# Patient Record
Sex: MALE
[De-identification: names, ages, dates, MRNs, and addresses within clinical notes are randomized per-mention and may not be internally consistent; named-entity substitution may affect disease eponyms.]

---

## 2021-03-19 PROBLEM — Z00.00 ENCOUNTER FOR PREVENTIVE HEALTH EXAMINATION: Status: ACTIVE | Noted: 2021-03-19

## 2023-02-14 ENCOUNTER — NON-APPOINTMENT (OUTPATIENT)
Age: 28
End: 2023-02-14

## 2023-02-14 ENCOUNTER — APPOINTMENT (OUTPATIENT)
Dept: DERMATOLOGY | Facility: CLINIC | Age: 28
End: 2023-02-14
Payer: MEDICAID

## 2023-02-14 DIAGNOSIS — L65.9 NONSCARRING HAIR LOSS, UNSPECIFIED: ICD-10-CM

## 2023-02-14 DIAGNOSIS — L21.9 SEBORRHEIC DERMATITIS, UNSPECIFIED: ICD-10-CM

## 2023-02-14 PROCEDURE — 99204 OFFICE O/P NEW MOD 45 MIN: CPT

## 2023-02-14 RX ORDER — HYDROCORTISONE 25 MG/G
2.5 CREAM TOPICAL
Qty: 1 | Refills: 4 | Status: ACTIVE | COMMUNITY
Start: 2023-02-14 | End: 1900-01-01

## 2023-02-14 RX ORDER — KETOCONAZOLE 20.5 MG/ML
2 SHAMPOO, SUSPENSION TOPICAL
Qty: 1 | Refills: 11 | Status: ACTIVE | COMMUNITY
Start: 2023-02-14 | End: 1900-01-01

## 2023-02-14 RX ORDER — FLUOCINOLONE ACETONIDE 0.1 MG/ML
0.01 SOLUTION TOPICAL
Qty: 1 | Refills: 2 | Status: ACTIVE | COMMUNITY
Start: 2023-02-14 | End: 1900-01-01

## 2023-02-14 NOTE — PHYSICAL EXAM
[Alert] : alert [Oriented x 3] : ~L oriented x 3 [FreeTextEntry3] : Pt consented to examination of external genitalia\par \par Loose scale throughout scalp\par Normal hair density\par Negative hair pull test\par Small pink papules on scrotum; inguinal folds clear

## 2023-02-14 NOTE — ASSESSMENT
[FreeTextEntry1] : # Seborrheic dermatitis - chronic condition not at tx goal\par - Discussed condition, associations, and natural course\par - Favor scalp and scrotum involvement\par - Ketoconazole 2% shampoo 2-3 times weekly; lather for 3-5 minutes then rinse\par - For flares: hydrocortisone 2.5% cream BID PRN on scrotum, no more than 2 weeks at a time and fluocinolone 0.1% solution BID PRN on scalp; SED and proper use reviewed including avoidance of prolonged use\par - Keep groin area dry \par \par # Alopecia\par - No objective alopecia noted today\par - Likely resolved TE vs. natural variation of hair cycle\par - Continue to monitor\par - May start rogaine 5% if desired\par \par # Family hx of skin cancer\par - Recommend finding out details of skin cancer history in grandfather\par - Will plan CBE at follow up\par - Discussed importance of photoprotection and sunscreen use\par \par FU 3-4 months

## 2023-02-14 NOTE — HISTORY OF PRESENT ILLNESS
[FreeTextEntry1] : Scalp rash, groin rash, hair loss - NPA [de-identified] : # Rash\par Onset: About 1 month\par Location: Scalp\par Symptoms: Itch, dryness\par Previous treatments and response: Tried OTC shampoos without improvement\par Washes scalp every 2 days\par Feels that hair has been shedding more since last summer; notes personal stressors\par \par # Rash\par Onset: On and off\par Location: Scrotum\par Symptoms: Itch\par Previous treatments and response: Lotrimin helps\par \par Notes grandfather had skin cancer, unsure of which type.\par Prior derm recommended yearly CBE.\par \par

## 2023-05-19 ENCOUNTER — APPOINTMENT (OUTPATIENT)
Dept: DERMATOLOGY | Facility: CLINIC | Age: 28
End: 2023-05-19